# Patient Record
Sex: FEMALE | Race: WHITE | NOT HISPANIC OR LATINO | URBAN - METROPOLITAN AREA
[De-identification: names, ages, dates, MRNs, and addresses within clinical notes are randomized per-mention and may not be internally consistent; named-entity substitution may affect disease eponyms.]

---

## 2020-07-25 ENCOUNTER — TELEPHONE ENCOUNTER (OUTPATIENT)
Dept: URBAN - METROPOLITAN AREA CLINIC 13 | Facility: CLINIC | Age: 55
End: 2020-07-25

## 2020-07-25 RX ORDER — INSULIN DEGLUDEC INJECTION 200 U/ML
INJECT SUBCUTANEOUSLY AS DIRECTED INJECTION, SOLUTION SUBCUTANEOUS
Refills: 0 | OUTPATIENT
End: 2019-09-23

## 2020-07-25 RX ORDER — PROMETHAZINE HYDROCHLORIDE 12.5 MG/1
TAKE ONE TABLET EVERY 12 HOURS AS NEEDED FOR NAUSEA TABLET ORAL
Qty: 60 | Refills: 4 | OUTPATIENT
Start: 2016-01-18 | End: 2018-08-06

## 2020-07-25 RX ORDER — SITAGLIPTIN PHOSPHATE 100 MG
TAKE 1 TABLET DAILY TABLET ORAL
Refills: 0 | OUTPATIENT
End: 2018-08-06

## 2020-07-25 RX ORDER — TRAMADOL HYDROCHLORIDE 50 MG/1
TAKE 1 TABLET BY MOUTH EVERY 6 HOURS AS NEEDED FOR PAIN TABLET ORAL
Qty: 60 | Refills: 0 | OUTPATIENT
Start: 2016-01-18 | End: 2018-03-30

## 2020-07-25 RX ORDER — OMEPRAZOLE 20 MG/1
TAKE 1 CAPSULE DAILY CAPSULE, DELAYED RELEASE ORAL
Qty: 30 | Refills: 8 | OUTPATIENT
End: 2016-01-18

## 2020-07-25 RX ORDER — PROMETHAZINE HYDROCHLORIDE 25 MG/1
INSERT 1 SUPPOSITORY RECTALLY EVERY 8 HOURS AS NEEDED SUPPOSITORY RECTAL
Qty: 20 | Refills: 2 | OUTPATIENT
Start: 2016-01-18 | End: 2018-08-06

## 2020-07-26 ENCOUNTER — TELEPHONE ENCOUNTER (OUTPATIENT)
Dept: URBAN - METROPOLITAN AREA CLINIC 13 | Facility: CLINIC | Age: 55
End: 2020-07-26

## 2020-07-26 RX ORDER — DEXTROSE 4 G
TAKE ONE TABLET BY MOUTH ONE TIME DAILY FOR 14 DAYS TABLET,CHEWABLE ORAL
Qty: 14 | Refills: 0 | Status: ACTIVE | COMMUNITY
Start: 2018-08-12

## 2020-07-26 RX ORDER — PANTOPRAZOLE SODIUM 40 MG/1
TAKE ONE TABLET BY MOUTH ONE TIME DAILY TABLET, DELAYED RELEASE ORAL
Qty: 30 | Refills: 11 | Status: ACTIVE | COMMUNITY
Start: 2016-01-18

## 2020-07-26 RX ORDER — BLOOD SUGAR DIAGNOSTIC
STRIP MISCELLANEOUS
Qty: 150 | Refills: 0 | Status: ACTIVE | COMMUNITY
Start: 2019-01-30

## 2020-07-26 RX ORDER — BLOOD-GLUCOSE METER
KIT MISCELLANEOUS
Qty: 1 | Refills: 0 | Status: ACTIVE | COMMUNITY
Start: 2019-01-30

## 2020-07-26 RX ORDER — GLIPIZIDE 10 MG/1
TAKE 1 TABLET TWICE DAILY TABLET ORAL
Refills: 0 | Status: ACTIVE | COMMUNITY

## 2020-07-26 RX ORDER — LIDOCAINE HYDROCHLORIDE 20 MG/ML
SOLUTION ORAL; TOPICAL
Qty: 150 | Refills: 0 | Status: ACTIVE | COMMUNITY
Start: 2019-04-22

## 2020-07-26 RX ORDER — PENICILLIN V POTASSIUM 250 MG/1
TABLET, FILM COATED ORAL
Qty: 40 | Refills: 0 | Status: ACTIVE | COMMUNITY
Start: 2019-04-22

## 2020-07-26 RX ORDER — FAMOTIDINE 40 MG/1
TAKE ONE TABLET BY MOUTH AT BEDTIME TABLET ORAL
Qty: 30 | Refills: 11 | Status: ACTIVE | COMMUNITY
Start: 2016-04-18

## 2020-07-26 RX ORDER — METFORMIN HYDROCHLORIDE 1000 MG/1
TAKE 1 TABLET EVERY 12 HOURS TABLET, COATED ORAL
Refills: 0 | Status: ACTIVE | COMMUNITY

## 2020-07-26 RX ORDER — INSULIN ASPART 100 [IU]/ML
INJECTION, SOLUTION INTRAVENOUS; SUBCUTANEOUS
Qty: 15 | Refills: 0 | Status: ACTIVE | COMMUNITY
Start: 2019-01-30

## 2020-07-26 RX ORDER — PHENTERMINE HYDROCHLORIDE 30 MG/1
TABLET ORAL
Qty: 30 | Refills: 0 | Status: ACTIVE | COMMUNITY
Start: 2018-10-29

## 2020-07-26 RX ORDER — AMOXICILLIN 875 MG/1
TABLET, FILM COATED ORAL
Qty: 20 | Refills: 0 | Status: ACTIVE | COMMUNITY
Start: 2018-08-01

## 2020-07-26 RX ORDER — INSULIN LISPRO 200 [IU]/ML
AS NEEDED INJECTION, SOLUTION SUBCUTANEOUS
Refills: 0 | Status: ACTIVE | COMMUNITY
Start: 2019-06-07

## 2020-07-26 RX ORDER — INSULIN GLARGINE 300 U/ML
INJECTION, SOLUTION SUBCUTANEOUS
Qty: 2 | Refills: 0 | Status: ACTIVE | COMMUNITY
Start: 2019-04-10

## 2020-07-26 RX ORDER — PHENTERMINE HYDROCHLORIDE 30 MG/1
TABLET ORAL
Qty: 30 | Refills: 0 | Status: ACTIVE | COMMUNITY
Start: 2019-03-12

## 2020-07-26 RX ORDER — PREDNISONE 20 MG/1
TABLET ORAL
Qty: 10 | Refills: 0 | Status: ACTIVE | COMMUNITY
Start: 2019-01-16

## 2020-07-26 RX ORDER — IBUPROFEN 800 MG/1
TAKE 1 TABLET 3 TIMES DAILY WITH FOOD AS NEEDED TABLET ORAL
Qty: 45 | Refills: 2 | Status: ACTIVE | COMMUNITY
Start: 2018-08-06

## 2020-07-26 RX ORDER — LISINOPRIL 10 MG/1
TAKE 1 TABLET DAILY TABLET ORAL
Refills: 0 | Status: ACTIVE | COMMUNITY

## 2020-07-26 RX ORDER — FLUTICASONE PROPIONATE 50 UG/1
SPRAY, METERED NASAL
Qty: 16 | Refills: 0 | Status: ACTIVE | COMMUNITY
Start: 2018-08-12

## 2020-07-26 RX ORDER — TRAZODONE HYDROCHLORIDE 50 MG/1
TAKE 1/2 TABLET AT BEDTIME. AS NEEDED TABLET ORAL
Refills: 0 | Status: ACTIVE | COMMUNITY
Start: 2019-03-08

## 2020-07-26 RX ORDER — CYCLOBENZAPRINE HYDROCHLORIDE 10 MG/1
TABLET, FILM COATED ORAL
Qty: 30 | Refills: 0 | Status: ACTIVE | COMMUNITY
Start: 2019-01-16

## 2020-07-26 RX ORDER — MELOXICAM 7.5 MG/1
TABLET ORAL
Qty: 20 | Refills: 0 | Status: ACTIVE | COMMUNITY
Start: 2019-05-22

## 2020-07-26 RX ORDER — HYDROCHLOROTHIAZIDE 25 MG/1
TAKE 1 TABLET DAILY AS DIRECTED TABLET ORAL
Refills: 0 | Status: ACTIVE | COMMUNITY

## 2020-07-26 RX ORDER — HYDROCODONE BITARTRATE AND ACETAMINOPHEN 5; 325 MG/1; MG/1
TABLET ORAL
Qty: 20 | Refills: 0 | Status: ACTIVE | COMMUNITY
Start: 2019-04-17

## 2020-07-26 RX ORDER — INSULIN GLARGINE 300 U/ML
INJECT 18 UNIT DAILY INJECTION, SOLUTION SUBCUTANEOUS
Refills: 0 | Status: ACTIVE | COMMUNITY
Start: 2019-06-07

## 2020-07-26 RX ORDER — CHLORHEXIDINE GLUCONATE 0.12% ORAL RINSE 1.2 MG/ML
SOLUTION ORAL
Qty: 473 | Refills: 0 | Status: ACTIVE | COMMUNITY
Start: 2019-04-22

## 2022-06-20 ENCOUNTER — OFFICE VISIT (OUTPATIENT)
Dept: URBAN - METROPOLITAN AREA CLINIC 113 | Facility: CLINIC | Age: 57
End: 2022-06-20

## 2022-06-20 RX ORDER — FAMOTIDINE 40 MG/1
TAKE ONE TABLET BY MOUTH AT BEDTIME TABLET ORAL
Qty: 30 | Refills: 11 | Status: ACTIVE | COMMUNITY
Start: 2016-04-18

## 2022-06-20 RX ORDER — HYDROCODONE BITARTRATE AND ACETAMINOPHEN 5; 325 MG/1; MG/1
TABLET ORAL
Qty: 20 | Refills: 0 | Status: ACTIVE | COMMUNITY
Start: 2019-04-17

## 2022-06-20 RX ORDER — HYDROCHLOROTHIAZIDE 25 MG/1
TAKE 1 TABLET DAILY AS DIRECTED TABLET ORAL
Refills: 0 | Status: ACTIVE | COMMUNITY

## 2022-06-20 RX ORDER — INSULIN LISPRO 200 [IU]/ML
AS NEEDED INJECTION, SOLUTION SUBCUTANEOUS
Refills: 0 | Status: ACTIVE | COMMUNITY
Start: 2019-06-07

## 2022-06-20 RX ORDER — AMOXICILLIN 875 MG/1
TABLET, FILM COATED ORAL
Qty: 20 | Refills: 0 | Status: ACTIVE | COMMUNITY
Start: 2018-08-01

## 2022-06-20 RX ORDER — INSULIN GLARGINE 300 U/ML
INJECTION, SOLUTION SUBCUTANEOUS
Qty: 2 | Refills: 0 | Status: ACTIVE | COMMUNITY
Start: 2019-04-10

## 2022-06-20 RX ORDER — LIDOCAINE HYDROCHLORIDE 20 MG/ML
SOLUTION ORAL; TOPICAL
Qty: 150 | Refills: 0 | Status: ACTIVE | COMMUNITY
Start: 2019-04-22

## 2022-06-20 RX ORDER — PANTOPRAZOLE SODIUM 40 MG/1
TAKE ONE TABLET BY MOUTH ONE TIME DAILY TABLET, DELAYED RELEASE ORAL
Qty: 30 | Refills: 11 | Status: ACTIVE | COMMUNITY
Start: 2016-01-18

## 2022-06-20 RX ORDER — BLOOD-GLUCOSE METER
KIT MISCELLANEOUS
Qty: 1 | Refills: 0 | Status: ACTIVE | COMMUNITY
Start: 2019-01-30

## 2022-06-20 RX ORDER — INSULIN ASPART 100 [IU]/ML
INJECTION, SOLUTION INTRAVENOUS; SUBCUTANEOUS
Qty: 15 | Refills: 0 | Status: ACTIVE | COMMUNITY
Start: 2019-01-30

## 2022-06-20 RX ORDER — DEXTROSE 4 G
TAKE ONE TABLET BY MOUTH ONE TIME DAILY FOR 14 DAYS TABLET,CHEWABLE ORAL
Qty: 14 | Refills: 0 | Status: ACTIVE | COMMUNITY
Start: 2018-08-12

## 2022-06-20 RX ORDER — CHLORHEXIDINE GLUCONATE 0.12% ORAL RINSE 1.2 MG/ML
SOLUTION ORAL
Qty: 473 | Refills: 0 | Status: ACTIVE | COMMUNITY
Start: 2019-04-22

## 2022-06-20 RX ORDER — LISINOPRIL 10 MG/1
TAKE 1 TABLET DAILY TABLET ORAL
Refills: 0 | Status: ACTIVE | COMMUNITY

## 2022-06-20 RX ORDER — PENICILLIN V POTASSIUM 250 MG/1
TABLET, FILM COATED ORAL
Qty: 40 | Refills: 0 | Status: ACTIVE | COMMUNITY
Start: 2019-04-22

## 2022-06-20 RX ORDER — CYCLOBENZAPRINE HYDROCHLORIDE 10 MG/1
TABLET, FILM COATED ORAL
Qty: 30 | Refills: 0 | Status: ACTIVE | COMMUNITY
Start: 2019-01-16

## 2022-06-20 RX ORDER — METFORMIN HYDROCHLORIDE 1000 MG/1
TAKE 1 TABLET EVERY 12 HOURS TABLET, COATED ORAL
Refills: 0 | Status: ACTIVE | COMMUNITY

## 2022-06-20 RX ORDER — MELOXICAM 7.5 MG/1
TABLET ORAL
Qty: 20 | Refills: 0 | Status: ACTIVE | COMMUNITY
Start: 2019-05-22

## 2022-06-20 RX ORDER — BLOOD SUGAR DIAGNOSTIC
STRIP MISCELLANEOUS
Qty: 150 | Refills: 0 | Status: ACTIVE | COMMUNITY
Start: 2019-01-30

## 2022-06-20 RX ORDER — FLUTICASONE PROPIONATE 50 UG/1
SPRAY, METERED NASAL
Qty: 16 | Refills: 0 | Status: ACTIVE | COMMUNITY
Start: 2018-08-12

## 2022-06-20 RX ORDER — PHENTERMINE HYDROCHLORIDE 30 MG/1
TABLET ORAL
Qty: 30 | Refills: 0 | Status: ACTIVE | COMMUNITY
Start: 2018-10-29

## 2022-06-20 RX ORDER — TRAZODONE HYDROCHLORIDE 50 MG/1
TAKE 1/2 TABLET AT BEDTIME. AS NEEDED TABLET ORAL
Refills: 0 | Status: ACTIVE | COMMUNITY
Start: 2019-03-08

## 2022-06-20 RX ORDER — PREDNISONE 20 MG/1
TABLET ORAL
Qty: 10 | Refills: 0 | Status: ACTIVE | COMMUNITY
Start: 2019-01-16

## 2022-06-20 RX ORDER — GLIPIZIDE 10 MG/1
TAKE 1 TABLET TWICE DAILY TABLET ORAL
Refills: 0 | Status: ACTIVE | COMMUNITY

## 2022-06-20 RX ORDER — IBUPROFEN 800 MG/1
TAKE 1 TABLET 3 TIMES DAILY WITH FOOD AS NEEDED TABLET ORAL
Qty: 45 | Refills: 2 | Status: ACTIVE | COMMUNITY
Start: 2018-08-06

## 2022-06-25 ENCOUNTER — WEB ENCOUNTER (OUTPATIENT)
Dept: URBAN - METROPOLITAN AREA CLINIC 113 | Facility: CLINIC | Age: 57
End: 2022-06-25

## 2023-01-03 ENCOUNTER — WEB ENCOUNTER (OUTPATIENT)
Dept: URBAN - METROPOLITAN AREA CLINIC 113 | Facility: CLINIC | Age: 58
End: 2023-01-03

## 2023-01-03 ENCOUNTER — LAB OUTSIDE AN ENCOUNTER (OUTPATIENT)
Dept: URBAN - METROPOLITAN AREA CLINIC 113 | Facility: CLINIC | Age: 58
End: 2023-01-03

## 2023-01-03 ENCOUNTER — DASHBOARD ENCOUNTERS (OUTPATIENT)
Age: 58
End: 2023-01-03

## 2023-01-03 ENCOUNTER — OFFICE VISIT (OUTPATIENT)
Dept: URBAN - METROPOLITAN AREA CLINIC 113 | Facility: CLINIC | Age: 58
End: 2023-01-03
Payer: COMMERCIAL

## 2023-01-03 VITALS
DIASTOLIC BLOOD PRESSURE: 57 MMHG | RESPIRATION RATE: 18 BRPM | WEIGHT: 208 LBS | HEIGHT: 66 IN | HEART RATE: 62 BPM | TEMPERATURE: 97.8 F | BODY MASS INDEX: 33.43 KG/M2 | SYSTOLIC BLOOD PRESSURE: 98 MMHG

## 2023-01-03 DIAGNOSIS — K21.9 GERD: ICD-10-CM

## 2023-01-03 DIAGNOSIS — K57.90 DIVERTICULOSIS: ICD-10-CM

## 2023-01-03 DIAGNOSIS — K31.84 GASTROPARESIS: ICD-10-CM

## 2023-01-03 DIAGNOSIS — Z86.010 PERSONAL HISTORY OF COLONIC POLYPS: ICD-10-CM

## 2023-01-03 PROBLEM — 428283002 HISTORY OF POLYP OF COLON (SITUATION): Status: ACTIVE | Noted: 2023-01-03

## 2023-01-03 PROCEDURE — 99214 OFFICE O/P EST MOD 30 MIN: CPT | Performed by: INTERNAL MEDICINE

## 2023-01-03 RX ORDER — BLOOD SUGAR DIAGNOSTIC
STRIP MISCELLANEOUS
Qty: 150 | Refills: 0 | Status: ON HOLD | COMMUNITY
Start: 2019-01-30

## 2023-01-03 RX ORDER — PROMETHAZINE HYDROCHLORIDE 12.5 MG/1
1 TABLET TABLET ORAL
Qty: 45 | Refills: 4 | OUTPATIENT
Start: 2023-01-03 | End: 2023-06-02

## 2023-01-03 RX ORDER — HYDROCHLOROTHIAZIDE 25 MG/1
TAKE 1 TABLET DAILY AS DIRECTED TABLET ORAL
Refills: 0 | Status: ACTIVE | COMMUNITY

## 2023-01-03 RX ORDER — PANTOPRAZOLE SODIUM 40 MG/1
1 TABLET TABLET, DELAYED RELEASE ORAL TWICE DAILY
Qty: 60 | Refills: 11 | OUTPATIENT
Start: 2023-01-03

## 2023-01-03 RX ORDER — PHENTERMINE HYDROCHLORIDE 30 MG/1
TABLET ORAL
Qty: 30 | Refills: 0 | Status: ON HOLD | COMMUNITY
Start: 2018-10-29

## 2023-01-03 RX ORDER — FAMOTIDINE 40 MG/1
TAKE ONE TABLET BY MOUTH AT BEDTIME TABLET ORAL
Qty: 30 | Refills: 11 | Status: ACTIVE | COMMUNITY
Start: 2016-04-18

## 2023-01-03 RX ORDER — GLIPIZIDE 10 MG/1
TAKE 1 TABLET TWICE DAILY TABLET ORAL
Refills: 0 | Status: ACTIVE | COMMUNITY

## 2023-01-03 RX ORDER — LIDOCAINE HYDROCHLORIDE 20 MG/ML
SOLUTION ORAL; TOPICAL
Qty: 150 | Refills: 0 | Status: ON HOLD | COMMUNITY
Start: 2019-04-22

## 2023-01-03 RX ORDER — LISINOPRIL 10 MG/1
TAKE 1 TABLET DAILY TABLET ORAL
Refills: 0 | Status: ACTIVE | COMMUNITY

## 2023-01-03 RX ORDER — INSULIN GLARGINE 300 U/ML
INJECTION, SOLUTION SUBCUTANEOUS
Qty: 2 | Refills: 0 | Status: ON HOLD | COMMUNITY
Start: 2019-04-10

## 2023-01-03 RX ORDER — PANTOPRAZOLE SODIUM 40 MG/1
TAKE ONE TABLET BY MOUTH ONE TIME DAILY TABLET, DELAYED RELEASE ORAL
Qty: 30 | Refills: 11 | Status: ACTIVE | COMMUNITY
Start: 2016-01-18

## 2023-01-03 RX ORDER — MELOXICAM 7.5 MG/1
TABLET ORAL
Qty: 20 | Refills: 0 | Status: ON HOLD | COMMUNITY
Start: 2019-05-22

## 2023-01-03 RX ORDER — FAMOTIDINE 40 MG/1
1 TABLET AT BEDTIME TABLET, FILM COATED ORAL ONCE A DAY
Qty: 90 | Refills: 3 | OUTPATIENT
Start: 2023-01-03

## 2023-01-03 RX ORDER — FLUTICASONE PROPIONATE 50 UG/1
SPRAY, METERED NASAL
Qty: 16 | Refills: 0 | Status: ON HOLD | COMMUNITY
Start: 2018-08-12

## 2023-01-03 RX ORDER — SITAGLIPTIN AND METFORMIN HYDROCHLORIDE 50; 1000 MG/1; MG/1
2 TABLETS WITH EVENING MEAL TABLET, FILM COATED, EXTENDED RELEASE ORAL ONCE A DAY
Status: ACTIVE | COMMUNITY

## 2023-01-03 RX ORDER — BLOOD-GLUCOSE METER
KIT MISCELLANEOUS
Qty: 1 | Refills: 0 | Status: ON HOLD | COMMUNITY
Start: 2019-01-30

## 2023-01-03 RX ORDER — DEXTROSE 4 G
TAKE ONE TABLET BY MOUTH ONE TIME DAILY FOR 14 DAYS TABLET,CHEWABLE ORAL
Qty: 14 | Refills: 0 | Status: ON HOLD | COMMUNITY
Start: 2018-08-12

## 2023-01-03 RX ORDER — HYDROCODONE BITARTRATE AND ACETAMINOPHEN 5; 325 MG/1; MG/1
TABLET ORAL
Qty: 20 | Refills: 0 | Status: ON HOLD | COMMUNITY
Start: 2019-04-17

## 2023-01-03 RX ORDER — ONDANSETRON 8 MG/1
1 TABLET ON THE TONGUE AND ALLOW TO DISSOLVE  AS NEEDED TABLET, ORALLY DISINTEGRATING ORAL
Qty: 45 | Refills: 6 | OUTPATIENT
Start: 2023-01-03

## 2023-01-03 RX ORDER — INSULIN LISPRO 200 [IU]/ML
AS NEEDED INJECTION, SOLUTION SUBCUTANEOUS
Refills: 0 | Status: ON HOLD | COMMUNITY
Start: 2019-06-07

## 2023-01-03 RX ORDER — IBUPROFEN 800 MG/1
TAKE 1 TABLET 3 TIMES DAILY WITH FOOD AS NEEDED TABLET ORAL
Qty: 45 | Refills: 2 | Status: ON HOLD | COMMUNITY
Start: 2018-08-06

## 2023-01-03 RX ORDER — CHLORHEXIDINE GLUCONATE 0.12% ORAL RINSE 1.2 MG/ML
SOLUTION ORAL
Qty: 473 | Refills: 0 | Status: ON HOLD | COMMUNITY
Start: 2019-04-22

## 2023-01-03 RX ORDER — ERTUGLIFLOZIN 5 MG/1
1 TABLET TABLET, FILM COATED ORAL ONCE A DAY
Status: ACTIVE | COMMUNITY

## 2023-01-03 RX ORDER — PENICILLIN V POTASSIUM 250 MG/1
TABLET, FILM COATED ORAL
Qty: 40 | Refills: 0 | Status: ON HOLD | COMMUNITY
Start: 2019-04-22

## 2023-01-03 RX ORDER — ROSUVASTATIN CALCIUM 10 MG/1
1 TABLET TABLET, FILM COATED ORAL ONCE A DAY
Status: ACTIVE | COMMUNITY

## 2023-01-03 RX ORDER — DICYCLOMINE HYDROCHLORIDE 20 MG/1
1 TABLET TABLET ORAL
Qty: 45 | Refills: 6 | OUTPATIENT
Start: 2023-01-03 | End: 2023-08-01

## 2023-01-03 RX ORDER — CYCLOBENZAPRINE HYDROCHLORIDE 10 MG/1
TABLET, FILM COATED ORAL
Qty: 30 | Refills: 0 | Status: ON HOLD | COMMUNITY
Start: 2019-01-16

## 2023-01-03 RX ORDER — AMOXICILLIN 875 MG/1
TABLET, FILM COATED ORAL
Qty: 20 | Refills: 0 | Status: ON HOLD | COMMUNITY
Start: 2018-08-01

## 2023-01-03 RX ORDER — METFORMIN HYDROCHLORIDE 1000 MG/1
TAKE 1 TABLET EVERY 12 HOURS TABLET, COATED ORAL
Refills: 0 | Status: ON HOLD | COMMUNITY

## 2023-01-03 RX ORDER — TRAZODONE HYDROCHLORIDE 100 MG/1
TAKE 1/2 TABLET AT BEDTIME. AS NEEDED TABLET ORAL
Refills: 0 | Status: ACTIVE | COMMUNITY
Start: 2019-03-08

## 2023-01-03 RX ORDER — PREDNISONE 20 MG/1
TABLET ORAL
Qty: 10 | Refills: 0 | Status: ON HOLD | COMMUNITY
Start: 2019-01-16

## 2023-01-03 RX ORDER — INSULIN ASPART 100 [IU]/ML
INJECTION, SOLUTION INTRAVENOUS; SUBCUTANEOUS
Qty: 15 | Refills: 0 | Status: ON HOLD | COMMUNITY
Start: 2019-01-30

## 2023-01-03 NOTE — HPI-TODAY'S VISIT:
Very pleasant 57-year-old female presents with a history of gastroesophageal reflux disease and gastroparesis. . She did well for a number of years but then the last year her hemoglobin A1c crept up to 13%.  Then she began having difficulties with epigastric discomfort.  Intermittent.  At times severe.  With nausea and vomiting.  Nausea seems to respond to promethazine.  No reports of dysphagia.  She is compliant with famotidine and pantoprazole.  Bowel movements are regular.  No rectal bleeding or melena.  Weight is stable.  She states that her most recent hemoglobin A1c was in the 6.5 to 8% range.  Diabetic control is now improving. .

## 2023-01-03 NOTE — HPI-TODAY'S VISIT:
Colonoscopy 2017.  5 mm sessile sigmoid colon polyp.  Moderate sigmoid diverticulosis.  Tortuous sigmoid colon.  Biopsies revealed tubular adenoma. . EGD 2016.  Normal . EGD 2012.  [facility].  Retained gastric contents consistent with gastroparesis.  Biopsies . CT 2018. Normal . GES 2016. Consistent with gastroparesis. . CT ABD 2011. Hepatic steatosis

## 2023-01-23 PROBLEM — 235595009 GASTROESOPHAGEAL REFLUX DISEASE: Status: ACTIVE | Noted: 2023-01-03

## 2023-02-16 ENCOUNTER — LAB OUTSIDE AN ENCOUNTER (OUTPATIENT)
Dept: URBAN - METROPOLITAN AREA CLINIC 113 | Facility: CLINIC | Age: 58
End: 2023-02-16

## 2023-02-16 ENCOUNTER — TELEPHONE ENCOUNTER (OUTPATIENT)
Dept: URBAN - METROPOLITAN AREA CLINIC 113 | Facility: CLINIC | Age: 58
End: 2023-02-16

## 2023-02-16 ENCOUNTER — OFFICE VISIT (OUTPATIENT)
Dept: URBAN - METROPOLITAN AREA SURGERY CENTER 25 | Facility: SURGERY CENTER | Age: 58
End: 2023-02-16
Payer: COMMERCIAL

## 2023-02-16 DIAGNOSIS — K22.89 PATULOUS LOWER ESOPHAGEAL SPHINCTER: ICD-10-CM

## 2023-02-16 DIAGNOSIS — K31.84 GASTROPARESIS: ICD-10-CM

## 2023-02-16 DIAGNOSIS — K31.89 REACTIVE GASTROPATHY: ICD-10-CM

## 2023-02-16 PROBLEM — 398050005 DIVERTICULAR DISEASE OF COLON: Status: ACTIVE | Noted: 2023-01-03

## 2023-02-16 PROCEDURE — 43239 EGD BIOPSY SINGLE/MULTIPLE: CPT | Performed by: INTERNAL MEDICINE

## 2023-02-16 PROCEDURE — G8907 PT DOC NO EVENTS ON DISCHARG: HCPCS | Performed by: INTERNAL MEDICINE

## 2023-02-16 RX ORDER — ERTUGLIFLOZIN 5 MG/1
1 TABLET TABLET, FILM COATED ORAL ONCE A DAY
Status: ACTIVE | COMMUNITY

## 2023-02-16 RX ORDER — FAMOTIDINE 40 MG/1
1 TABLET AT BEDTIME TABLET, FILM COATED ORAL ONCE A DAY
Qty: 90 | Refills: 3 | Status: ACTIVE | COMMUNITY
Start: 2023-01-03

## 2023-02-16 RX ORDER — PANTOPRAZOLE SODIUM 40 MG/1
TAKE ONE TABLET BY MOUTH ONE TIME DAILY TABLET, DELAYED RELEASE ORAL
Qty: 30 | Refills: 11 | Status: ACTIVE | COMMUNITY
Start: 2016-01-18

## 2023-02-16 RX ORDER — PENICILLIN V POTASSIUM 250 MG/1
TABLET, FILM COATED ORAL
Qty: 40 | Refills: 0 | Status: ON HOLD | COMMUNITY
Start: 2019-04-22

## 2023-02-16 RX ORDER — HYDROCODONE BITARTRATE AND ACETAMINOPHEN 5; 325 MG/1; MG/1
TABLET ORAL
Qty: 20 | Refills: 0 | Status: ON HOLD | COMMUNITY
Start: 2019-04-17

## 2023-02-16 RX ORDER — LIDOCAINE HYDROCHLORIDE 20 MG/ML
SOLUTION ORAL; TOPICAL
Qty: 150 | Refills: 0 | Status: ON HOLD | COMMUNITY
Start: 2019-04-22

## 2023-02-16 RX ORDER — ROSUVASTATIN CALCIUM 10 MG/1
1 TABLET TABLET, FILM COATED ORAL ONCE A DAY
Status: ACTIVE | COMMUNITY

## 2023-02-16 RX ORDER — AMOXICILLIN 875 MG/1
TABLET, FILM COATED ORAL
Qty: 20 | Refills: 0 | Status: ON HOLD | COMMUNITY
Start: 2018-08-01

## 2023-02-16 RX ORDER — MELOXICAM 7.5 MG/1
TABLET ORAL
Qty: 20 | Refills: 0 | Status: ON HOLD | COMMUNITY
Start: 2019-05-22

## 2023-02-16 RX ORDER — DEXTROSE 4 G
TAKE ONE TABLET BY MOUTH ONE TIME DAILY FOR 14 DAYS TABLET,CHEWABLE ORAL
Qty: 14 | Refills: 0 | Status: ON HOLD | COMMUNITY
Start: 2018-08-12

## 2023-02-16 RX ORDER — SITAGLIPTIN AND METFORMIN HYDROCHLORIDE 50; 1000 MG/1; MG/1
2 TABLETS WITH EVENING MEAL TABLET, FILM COATED, EXTENDED RELEASE ORAL ONCE A DAY
Status: ACTIVE | COMMUNITY

## 2023-02-16 RX ORDER — FLUTICASONE PROPIONATE 50 UG/1
SPRAY, METERED NASAL
Qty: 16 | Refills: 0 | Status: ON HOLD | COMMUNITY
Start: 2018-08-12

## 2023-02-16 RX ORDER — TRAZODONE HYDROCHLORIDE 100 MG/1
TAKE 1/2 TABLET AT BEDTIME. AS NEEDED TABLET ORAL
Refills: 0 | Status: ACTIVE | COMMUNITY
Start: 2019-03-08

## 2023-02-16 RX ORDER — HYDROCHLOROTHIAZIDE 25 MG/1
TAKE 1 TABLET DAILY AS DIRECTED TABLET ORAL
Refills: 0 | Status: ACTIVE | COMMUNITY

## 2023-02-16 RX ORDER — BLOOD-GLUCOSE METER
KIT MISCELLANEOUS
Qty: 1 | Refills: 0 | Status: ON HOLD | COMMUNITY
Start: 2019-01-30

## 2023-02-16 RX ORDER — PANTOPRAZOLE SODIUM 40 MG/1
1 TABLET TABLET, DELAYED RELEASE ORAL TWICE DAILY
Qty: 60 | Refills: 11 | Status: ACTIVE | COMMUNITY
Start: 2023-01-03

## 2023-02-16 RX ORDER — INSULIN ASPART 100 [IU]/ML
INJECTION, SOLUTION INTRAVENOUS; SUBCUTANEOUS
Qty: 15 | Refills: 0 | Status: ON HOLD | COMMUNITY
Start: 2019-01-30

## 2023-02-16 RX ORDER — METFORMIN HYDROCHLORIDE 1000 MG/1
TAKE 1 TABLET EVERY 12 HOURS TABLET, COATED ORAL
Refills: 0 | Status: ON HOLD | COMMUNITY

## 2023-02-16 RX ORDER — INSULIN GLARGINE 300 U/ML
INJECTION, SOLUTION SUBCUTANEOUS
Qty: 2 | Refills: 0 | Status: ON HOLD | COMMUNITY
Start: 2019-04-10

## 2023-02-16 RX ORDER — FAMOTIDINE 40 MG/1
TAKE ONE TABLET BY MOUTH AT BEDTIME TABLET ORAL
Qty: 30 | Refills: 11 | Status: ACTIVE | COMMUNITY
Start: 2016-04-18

## 2023-02-16 RX ORDER — INSULIN LISPRO 200 [IU]/ML
AS NEEDED INJECTION, SOLUTION SUBCUTANEOUS
Refills: 0 | Status: ON HOLD | COMMUNITY
Start: 2019-06-07

## 2023-02-16 RX ORDER — BLOOD SUGAR DIAGNOSTIC
STRIP MISCELLANEOUS
Qty: 150 | Refills: 0 | Status: ON HOLD | COMMUNITY
Start: 2019-01-30

## 2023-02-16 RX ORDER — PHENTERMINE HYDROCHLORIDE 30 MG/1
TABLET ORAL
Qty: 30 | Refills: 0 | Status: ON HOLD | COMMUNITY
Start: 2018-10-29

## 2023-02-16 RX ORDER — ONDANSETRON 8 MG/1
1 TABLET ON THE TONGUE AND ALLOW TO DISSOLVE  AS NEEDED TABLET, ORALLY DISINTEGRATING ORAL
Qty: 45 | Refills: 6 | Status: ACTIVE | COMMUNITY
Start: 2023-01-03

## 2023-02-16 RX ORDER — LISINOPRIL 10 MG/1
TAKE 1 TABLET DAILY TABLET ORAL
Refills: 0 | Status: ACTIVE | COMMUNITY

## 2023-02-16 RX ORDER — CYCLOBENZAPRINE HYDROCHLORIDE 10 MG/1
TABLET, FILM COATED ORAL
Qty: 30 | Refills: 0 | Status: ON HOLD | COMMUNITY
Start: 2019-01-16

## 2023-02-16 RX ORDER — GLIPIZIDE 10 MG/1
TAKE 1 TABLET TWICE DAILY TABLET ORAL
Refills: 0 | Status: ACTIVE | COMMUNITY

## 2023-02-16 RX ORDER — DICYCLOMINE HYDROCHLORIDE 20 MG/1
1 TABLET TABLET ORAL
Qty: 45 | Refills: 6 | Status: ACTIVE | COMMUNITY
Start: 2023-01-03 | End: 2023-08-01

## 2023-02-16 RX ORDER — IBUPROFEN 800 MG/1
TAKE 1 TABLET 3 TIMES DAILY WITH FOOD AS NEEDED TABLET ORAL
Qty: 45 | Refills: 2 | Status: ON HOLD | COMMUNITY
Start: 2018-08-06

## 2023-02-16 RX ORDER — PROMETHAZINE HYDROCHLORIDE 12.5 MG/1
1 TABLET TABLET ORAL
Qty: 45 | Refills: 4 | Status: ACTIVE | COMMUNITY
Start: 2023-01-03 | End: 2023-06-02

## 2023-02-16 RX ORDER — PREDNISONE 20 MG/1
TABLET ORAL
Qty: 10 | Refills: 0 | Status: ON HOLD | COMMUNITY
Start: 2019-01-16

## 2023-02-16 RX ORDER — CHLORHEXIDINE GLUCONATE 0.12% ORAL RINSE 1.2 MG/ML
SOLUTION ORAL
Qty: 473 | Refills: 0 | Status: ON HOLD | COMMUNITY
Start: 2019-04-22

## 2023-02-23 PROBLEM — 235675006 GASTROPARESIS: Status: ACTIVE | Noted: 2023-01-03

## 2023-06-01 ENCOUNTER — OFFICE VISIT (OUTPATIENT)
Dept: URBAN - METROPOLITAN AREA CLINIC 113 | Facility: CLINIC | Age: 58
End: 2023-06-01